# Patient Record
Sex: FEMALE | Race: BLACK OR AFRICAN AMERICAN | URBAN - METROPOLITAN AREA
[De-identification: names, ages, dates, MRNs, and addresses within clinical notes are randomized per-mention and may not be internally consistent; named-entity substitution may affect disease eponyms.]

---

## 2018-05-04 ENCOUNTER — OFFICE VISIT (OUTPATIENT)
Dept: FAMILY MEDICINE CLINIC | Age: 38
End: 2018-05-04

## 2018-05-04 VITALS
HEIGHT: 60 IN | OXYGEN SATURATION: 94 % | SYSTOLIC BLOOD PRESSURE: 138 MMHG | RESPIRATION RATE: 16 BRPM | DIASTOLIC BLOOD PRESSURE: 84 MMHG | HEART RATE: 92 BPM | WEIGHT: 189.6 LBS | TEMPERATURE: 98.4 F | BODY MASS INDEX: 37.22 KG/M2

## 2018-05-04 DIAGNOSIS — E66.01 SEVERE OBESITY (BMI 35.0-39.9): ICD-10-CM

## 2018-05-04 DIAGNOSIS — M54.9 MID BACK PAIN ON LEFT SIDE: ICD-10-CM

## 2018-05-04 DIAGNOSIS — F17.200 SMOKER: ICD-10-CM

## 2018-05-04 DIAGNOSIS — R14.0 BLOATING: ICD-10-CM

## 2018-05-04 DIAGNOSIS — S29.012A STRAIN OF RHOMBOID MUSCLE, INITIAL ENCOUNTER: ICD-10-CM

## 2018-05-04 DIAGNOSIS — Z13.1 SCREENING FOR DIABETES MELLITUS (DM): ICD-10-CM

## 2018-05-04 DIAGNOSIS — Z13.220 SCREENING FOR LIPID DISORDERS: ICD-10-CM

## 2018-05-04 DIAGNOSIS — K59.01 SLOW TRANSIT CONSTIPATION: Primary | ICD-10-CM

## 2018-05-04 RX ORDER — POLYETHYLENE GLYCOL 3350 17 G/17G
17 POWDER, FOR SOLUTION ORAL DAILY
Qty: 30 EACH | Refills: 0 | Status: SHIPPED | OUTPATIENT
Start: 2018-05-04

## 2018-05-04 NOTE — PATIENT INSTRUCTIONS
Rhomboid Muscle Strain: Rehab Exercises  Your Care Instructions  Here are some examples of typical rehabilitation exercises for your condition. Start each exercise slowly. Ease off the exercise if you start to have pain. Your doctor or physical therapist will tell you when you can start these exercises and which ones will work best for you. How to do the exercises  Lower neck and upper back (rhomboid) stretch    1. Stretch your arms out in front of your body. Clasp one hand on top of your other hand. 2. Gently reach out so that you feel your shoulder blades stretching away from each other. 3. Gently bend your head forward. 4. Hold for 15 to 30 seconds. 5. Repeat 2 to 4 times. Resisted rows    For this exercise, you will need elastic exercise material, such as surgical tubing or Thera-Band. 1. Put the band around a solid object, such as a bedpost, at about waist level. Stand facing where you have placed the band. Hold equal lengths of the band in each hand. 2. Start with your arms held out in front of you. 3. Pull the bands back, and move your shoulder blades together. As you finish, your elbows should be at your side and bent at 90 degrees (like the angle of the letter \"L\"). 4. Return to the starting position. 5. Repeat 8 to 12 times. Neck stretches    1. Look straight ahead, and tip your right ear to your right shoulder. Do not let your left shoulder rise as you tip your head to the right. 2. Hold for 15 to 30 seconds. 3. Tilt your head to the left. Do not let your right shoulder rise as you tip your head to the left. 4. Hold for 15 to 30 seconds. 5. Repeat 2 to 4 times to each side. Neck rotation    1. Sit in a firm chair, or stand up straight. 2. Keeping your chin level, turn your head to the right, and hold for 15 to 30 seconds. 3. Turn your head to the left, and hold for 15 to 30 seconds. 4. Repeat 2 to 4 times to each side. Follow-up care is a key part of your treatment and safety. Be sure to make and go to all appointments, and call your doctor if you are having problems. It's also a good idea to know your test results and keep a list of the medicines you take. Where can you learn more? Go to http://kathi-tiffany.info/. Enter [phone] [id number] in the search box to learn more about \"Rhomboid Muscle Strain: Rehab Exercises. \"  Current as of: March 21, 2017  Content Version: 11.4  © 3308-8329 Healthwise, [employer]. Care instructions adapted under license by [employer] (which disclaims liability or warranty for this information). If you have questions about a medical condition or this instruction, always ask your healthcare professional. [address] any warranty or liability for your use of this information.

## 2018-05-04 NOTE — PROGRESS NOTES
Chief Complaint   Patient presents with   Nasreen Tavera Establish Care    Back Pain     x 6months, middle of back, stretching helps    Other     bloating, thinks due to back pain    Constipation

## 2018-05-04 NOTE — PROGRESS NOTES
Derek Hudson, 34 y.o.,  female    SUBJECTIVE  Establish care,    Pt c/o on and off soreness of mid back wrapping around lateral side to below the breast area L>R. She says ongoing for 6 months, most days. Says worse with moving from side to side or bending. 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 Says evaluated for this previously and says muscle relaxant trial did not help, only made her feel drowsy    Also has generalized abdominal bloating on and off past few months. Says she has constipatoin, does not feel she completely empty, strains with BMs. She says trying to improve her diet, styaying away from red meats and increasing fiber intake. No nausea, vomiting, flank pain. She is a smoker. ROS:  See HPI, all others negative        Patient Active Problem List   Diagnosis Code    Smoker F17.200       Current Outpatient Prescriptions   Medication Sig Dispense Refill    polyethylene glycol (MIRALAX) 17 gram packet Take 1 Packet by mouth daily. 30 Each 0       Allergies   Allergen Reactions    E-Mycin [Erythromycin] Nausea and Vomiting       History reviewed. No pertinent past medical history. Social History     Social History    Marital status: SINGLE     Spouse name: N/A    Number of children: N/A    Years of education: N/A     Occupational History    Not on file.      Social History Main Topics    Smoking status: Current Every Day Smoker     Years: 4.00    Smokeless tobacco: Never Used      Comment: 5 single cigarettes a day    Alcohol use Yes      Comment: 3 glasses of red wine/monthly    Drug use: No    Sexual activity: Yes     Partners: Male     Other Topics Concern    Not on file     Social History Narrative    No narrative on file       Family History   Problem Relation Age of Onset    Other Mother     Other Father     Hypertension Maternal Grandmother     Heart Disease Maternal Grandmother     Diabetes Maternal Grandmother     Stroke Maternal Grandmother     Arthritis-osteo Maternal Grandmother          OBJECTIVE    Physical Exam:     Visit Vitals    /84 (BP 1 Location: Left arm, BP Patient Position: Sitting)    Pulse 92    Temp 98.4 °F (36.9 °C) (Oral)    Resp 16    Ht 5' (1.524 m)    Wt 189 lb 9.6 oz (86 kg)    SpO2 94%    BMI 37.03 kg/m2       General: alert, well-appearing, AA, obese,in no apparent distress or pain  Head: atraumatic. Non-tender maxillary and frontal sinuses  Eyes: Lids with no discharge, no matting, conjunctivae clear and non injected, full EOMs, PERLLA  Ears: pinna non-tender, external auditory canal patent, TM intact  Mouth/throat:tonsils non enlarged, pharynx non erythematous and no lesion, nasal mucosa normal  Neck: supple, no adenopathy palpated  CVS: normal rate, regular rhythm, distinct S1 and S2  Lungs:clear to ausculation bilaterally, no crackles, wheezing or rhonchi noted  Abdomen: normoactive bowel sounds, soft, non-tender, neg Currie  Back: +MTTP on L rhomboid area, lateral rotation limited to the Right. Extremities: no edema, no cyanosis,  Skin: warm, no lesions, rashes noted  Psych:  mood and affect normal        ASSESSMENT/PLAN  Diagnoses and all orders for this visit:    1. Slow transit constipation  Advised to start daily miralax and probiotic  Increase fluid, fiber.   -     TSH 3RD GENERATION; Future    2. Smoker  Counseled on cessation    3. Bloating  -     VITAMIN D, 25 HYDROXY; Future    4. Screening for lipid disorders  -     LIPID PANEL; Future    5. Screening for diabetes mellitus (DM)  -     HEMOGLOBIN A1C W/O EAG; Future  -     METABOLIC PANEL, COMPREHENSIVE; Future    6. Strain of rhomboid muscle, initial encounter  Chronic, advised HEP   nsaids prn  -     REFERRAL TO PHYSICAL THERAPY  -     VITAMIN D, 25 HYDROXY; Future    7.  Mid back pain on left side  -     REFERRAL TO PHYSICAL THERAPY  -     VITAMIN D, 25 HYDROXY; Future    BMI 37   Encouraged wt loss    Other orders  -     polyethylene glycol (MIRALAX) 17 gram packet; Take 1 Packet by mouth daily. Follow-up Disposition:  Return in about 4 weeks (around 6/1/2018), or if symptoms worsen or fail to improve. Patient understands plan of care. Patient has provided input and agrees with goals.

## 2018-05-04 NOTE — MR AVS SNAPSHOT
[address] 
[phone] Patient: David Law MRN: [id number] Visit Information Date & Time Provider Department Dept. Phone Encounter #  
 5/4/2018  1:30 PM Kenrick Mckee, [address] [place] [address] [id number] Follow-up Instructions Return in about 4 weeks (around 6/1/2018), or if symptoms worsen or fail to improve. Upcoming Health Maintenance Date Due DTaP/Tdap/Td series (1 - Tdap) 1/14/1999 PAP AKA CERVICAL CYTOLOGY 1/14/1999 Influenza Age 5 to Adult 8/1/2018 Allergies as of 5/4/2018  Review Complete On: 5/4/2018 By: Kenrick Mckee MD  
  
 Severity Noted Reaction Type Reactions E-mycin [Erythromycin]  05/04/2018    Nausea and Vomiting Current Immunizations  Never Reviewed No immunizations on file. Not reviewed this visit You Were Diagnosed With   
  
 Codes Comments Slow transit constipation    -  Primary ICD-10-CM: K59.01 
ICD-9-CM: 564.01 Smoker     ICD-10-CM: F06.129 ICD-9-CM: 305.1 Bloating     ICD-10-CM: R14.0 ICD-9-CM: 787.3 Screening for lipid disorders     ICD-10-CM: Z13.220 ICD-9-CM: V77.91 Screening for diabetes mellitus (DM)     ICD-10-CM: Z13.1 ICD-9-CM: V77.1 Strain of rhomboid muscle, initial encounter     ICD-10-CM: S29.012A ICD-9-CM: 847.1 Mid back pain on left side     ICD-10-CM: M54.9 ICD-9-CM: 724.5 Vitals BP Pulse Temp Resp Height(growth percentile) Weight(growth percentile) 138/84 (BP 1 Location: Left arm, BP Patient Position: Sitting) 92 98.4 °F (36.9 °C) (Oral) 16 5' (1.524 m) 189 lb 9.6 oz (86 kg) SpO2 BMI Smoking Status 94% 37.03 kg/m2 Current Every Day Smoker Vitals History BMI and BSA Data Body Mass Index Body Surface Area  
 37.03 kg/m 2 1.91 m 2 Your Updated Medication List  
  
   
 This list is accurate as of 5/4/18  2:16 PM.  Always use your most recent med list.  
  
  
  
  
 polyethylene glycol 17 gram packet Commonly known as:  Lila Cancel Take 1 Packet by mouth daily. Prescriptions Printed Refills  
 polyethylene glycol (MIRALAX) 17 gram packet 0 Sig: Take 1 Packet by mouth daily. Class: Print Route: Oral  
  
We Performed the Following REFERRAL TO PHYSICAL THERAPY [SIL52 Custom] Comments:  
 Please evaluate patient for mid back, rhomboid strain Follow-up Instructions Return in about 4 weeks (around 6/1/2018), or if symptoms worsen or fail to improve. To-Do List   
 05/04/2018 Lab:  HEMOGLOBIN A1C W/O EAG   
  
 05/04/2018 Lab:  LIPID PANEL   
  
 05/04/2018 Lab:  METABOLIC PANEL, COMPREHENSIVE   
  
 05/04/2018 Lab:  TSH 3RD GENERATION   
  
 05/04/2018 Lab:  VITAMIN D, 25 HYDROXY Referral Information Referral ID Referred By Referred To  
  
 [id number] Natalia ADAN Not Available Visits Status Start Date End Date 1 New Request 5/4/18 5/4/19 If your referral has a status of pending review or denied, additional information will be sent to support the outcome of this decision. Patient Instructions Rhomboid Muscle Strain: Rehab Exercises Your Care Instructions Here are some examples of typical rehabilitation exercises for your condition. Start each exercise slowly. Ease off the exercise if you start to have pain. Your doctor or physical therapist will tell you when you can start these exercises and which ones will work best for you. How to do the exercises Lower neck and upper back (rhomboid) stretch 1. Stretch your arms out in front of your body. Clasp one hand on top of your other hand. 2. Gently reach out so that you feel your shoulder blades stretching away from each other. 3. Gently bend your head forward. 4. Hold for 15 to 30 seconds. 5. Repeat 2 to 4 times. Resisted rows For this exercise, you will need elastic exercise material, such as surgical tubing or Thera-Band. 1. Put the band around a solid object, such as a bedpost, at about waist level. Stand facing where you have placed the band. Hold equal lengths of the band in each hand. 2. Start with your arms held out in front of you. 3. Pull the bands back, and move your shoulder blades together. As you finish, your elbows should be at your side and bent at 90 degrees (like the angle of the letter \"L\"). 4. Return to the starting position. 5. Repeat 8 to 12 times. Neck stretches 1. Look straight ahead, and tip your right ear to your right shoulder. Do not let your left shoulder rise as you tip your head to the right. 2. Hold for 15 to 30 seconds. 3. Tilt your head to the left. Do not let your right shoulder rise as you tip your head to the left. 4. Hold for 15 to 30 seconds. 5. Repeat 2 to 4 times to each side. Neck rotation 1. Sit in a firm chair, or stand up straight. 2. Keeping your chin level, turn your head to the right, and hold for 15 to 30 seconds. 3. Turn your head to the left, and hold for 15 to 30 seconds. 4. Repeat 2 to 4 times to each side. Follow-up care is a key part of your treatment and safety. Be sure to make and go to all appointments, and call your doctor if you are having problems. It's also a good idea to know your test results and keep a list of the medicines you take. Where can you learn more? Go to http://kathi-tiffany.info/. Enter [phone] in the search box to learn more about \"Rhomboid Muscle Strain: Rehab Exercises. \" Current as of: March 21, 2017 Content Version: 11.4 © 8324-4620 Healthwise, Incorporated. Care instructions adapted under license by [employer] (which disclaims liability or warranty for this information).  If you have questions about a medical condition or this instruction, always ask your healthcare professional. [place] [address] any warranty or liability for your use of this information. Introducing [facility]! Alyse Michael introduces [employer] patient portal. Now you can access parts of your medical record, email your doctor's office, and request medication refills online. 1. In your internet browser, go to [email or web address] 2. Click on the First Time User? Click Here link in the Sign In box. You will see the New Member Sign Up page. 3. Enter your [employer] Access Code exactly as it appears below. You will not need to use this code after youve completed the sign-up process. If you do not sign up before the expiration date, you must request a new code. · [employer] Access Code: [email or web address] Expires: 8/2/2018  2:16 PM 
 
4. Enter the last four digits of your Social Security Number (xxxx) and Date of Birth (mm/dd/yyyy) as indicated and click Submit. You will be taken to the next sign-up page. 5. Create a Angel[employer] ID. This will be your [employer] login ID and cannot be changed, so think of one that is secure and easy to remember. 6. Create a [employer] password. You can change your password at any time. 7. Enter your Password Reset Question and Answer. This can be used at a later time if you forget your password. 8. Enter your e-mail address. You will receive e-mail notification when new information is available in [address] 9. Click Sign Up. You can now view and download portions of your medical record. 10. Click the Download Summary menu link to download a portable copy of your medical information. If you have questions, please visit the Frequently Asked Questions section of the [employer] website. Remember, [employer] is NOT to be used for urgent needs. For medical emergencies, dial 911. Now available from your iPhone and Android! Please provide this summary of care documentation to your next provider. If you have any questions after today's visit, please call [phone].

## 2021-08-15 ENCOUNTER — IMAGING SERVICES (OUTPATIENT)
Dept: GENERAL RADIOLOGY | Age: 41
End: 2021-08-15
Attending: NURSE PRACTITIONER

## 2021-08-15 ENCOUNTER — WALK IN (OUTPATIENT)
Dept: URGENT CARE | Age: 41
End: 2021-08-15

## 2021-08-15 VITALS
RESPIRATION RATE: 18 BRPM | HEART RATE: 84 BPM | SYSTOLIC BLOOD PRESSURE: 130 MMHG | BODY MASS INDEX: 38.91 KG/M2 | HEIGHT: 59 IN | WEIGHT: 193 LBS | DIASTOLIC BLOOD PRESSURE: 84 MMHG | TEMPERATURE: 97 F

## 2021-08-15 DIAGNOSIS — S99.912A INJURY OF LEFT ANKLE, INITIAL ENCOUNTER: ICD-10-CM

## 2021-08-15 DIAGNOSIS — S99.922A INJURY OF LEFT FOOT, INITIAL ENCOUNTER: Primary | ICD-10-CM

## 2021-08-15 DIAGNOSIS — S92.902A CLOSED FRACTURE OF LEFT FOOT, INITIAL ENCOUNTER: ICD-10-CM

## 2021-08-15 DIAGNOSIS — S93.412A SPRAIN OF CALCANEOFIBULAR LIGAMENT OF LEFT ANKLE, INITIAL ENCOUNTER: ICD-10-CM

## 2021-08-15 PROBLEM — F17.200 SMOKER: Status: ACTIVE | Noted: 2018-05-04

## 2021-08-15 PROCEDURE — 73610 X-RAY EXAM OF ANKLE: CPT | Performed by: RADIOLOGY

## 2021-08-15 PROCEDURE — 29515 APPLICATION SHORT LEG SPLINT: CPT | Performed by: NURSE PRACTITIONER

## 2021-08-15 PROCEDURE — 99213 OFFICE O/P EST LOW 20 MIN: CPT | Performed by: NURSE PRACTITIONER

## 2021-08-15 PROCEDURE — 73630 X-RAY EXAM OF FOOT: CPT | Performed by: RADIOLOGY

## 2021-08-15 ASSESSMENT — PATIENT HEALTH QUESTIONNAIRE - PHQ9
SUM OF ALL RESPONSES TO PHQ9 QUESTIONS 1 AND 2: 0
SUM OF ALL RESPONSES TO PHQ9 QUESTIONS 1 AND 2: 0
1. LITTLE INTEREST OR PLEASURE IN DOING THINGS: NOT AT ALL
CLINICAL INTERPRETATION OF PHQ9 SCORE: NO FURTHER SCREENING NEEDED
CLINICAL INTERPRETATION OF PHQ2 SCORE: NO FURTHER SCREENING NEEDED
2. FEELING DOWN, DEPRESSED OR HOPELESS: NOT AT ALL

## 2021-08-15 ASSESSMENT — ENCOUNTER SYMPTOMS
NEUROLOGICAL NEGATIVE: 1
CONSTITUTIONAL NEGATIVE: 1